# Patient Record
Sex: MALE | Race: WHITE | URBAN - METROPOLITAN AREA
[De-identification: names, ages, dates, MRNs, and addresses within clinical notes are randomized per-mention and may not be internally consistent; named-entity substitution may affect disease eponyms.]

---

## 2017-08-17 ENCOUNTER — PAT TELEPHONE (OUTPATIENT)
Dept: PREADMISSION TESTING | Age: 62
End: 2017-08-17

## 2017-08-17 VITALS — WEIGHT: 275 LBS | BODY MASS INDEX: 38.5 KG/M2 | HEIGHT: 71 IN

## 2017-08-17 RX ORDER — AMLODIPINE BESYLATE AND BENAZEPRIL HYDROCHLORIDE 10; 20 MG/1; MG/1
1 CAPSULE ORAL DAILY
COMMUNITY

## 2017-08-17 RX ORDER — ASPIRIN 81 MG/1
81 TABLET, CHEWABLE ORAL DAILY
Status: ON HOLD | COMMUNITY
End: 2017-08-24 | Stop reason: HOSPADM

## 2017-08-17 RX ORDER — M-VIT,TX,IRON,MINS/CALC/FOLIC 27MG-0.4MG
1 TABLET ORAL DAILY
COMMUNITY

## 2017-08-17 RX ORDER — NEBIVOLOL 10 MG/1
10 TABLET ORAL DAILY
COMMUNITY
End: 2021-11-12

## 2017-08-17 RX ORDER — ASCORBIC ACID 500 MG
500 TABLET ORAL DAILY
COMMUNITY

## 2017-08-17 RX ORDER — SILDENAFIL 100 MG/1
100 TABLET, FILM COATED ORAL PRN
COMMUNITY
End: 2020-11-10 | Stop reason: ALTCHOICE

## 2017-09-25 ENCOUNTER — OFFICE VISIT (OUTPATIENT)
Dept: CARDIOLOGY CLINIC | Age: 62
End: 2017-09-25

## 2017-09-25 VITALS
WEIGHT: 273 LBS | BODY MASS INDEX: 38.22 KG/M2 | HEART RATE: 66 BPM | OXYGEN SATURATION: 97 % | DIASTOLIC BLOOD PRESSURE: 74 MMHG | HEIGHT: 71 IN | SYSTOLIC BLOOD PRESSURE: 114 MMHG

## 2017-09-25 DIAGNOSIS — I10 ESSENTIAL HYPERTENSION: ICD-10-CM

## 2017-09-25 DIAGNOSIS — I25.10 CORONARY ARTERY DISEASE INVOLVING NATIVE CORONARY ARTERY OF NATIVE HEART WITHOUT ANGINA PECTORIS: ICD-10-CM

## 2017-09-25 DIAGNOSIS — I48.20 CHRONIC ATRIAL FIBRILLATION (HCC): Primary | ICD-10-CM

## 2017-09-25 PROCEDURE — 93000 ELECTROCARDIOGRAM COMPLETE: CPT | Performed by: INTERNAL MEDICINE

## 2017-09-25 PROCEDURE — 99214 OFFICE O/P EST MOD 30 MIN: CPT | Performed by: INTERNAL MEDICINE

## 2017-09-25 RX ORDER — ATORVASTATIN CALCIUM 10 MG/1
10 TABLET, FILM COATED ORAL NIGHTLY
Qty: 30 TABLET | Refills: 2 | Status: SHIPPED | OUTPATIENT
Start: 2017-09-25 | End: 2017-11-08 | Stop reason: SDUPTHER

## 2017-11-08 RX ORDER — ATORVASTATIN CALCIUM 10 MG/1
10 TABLET, FILM COATED ORAL NIGHTLY
Qty: 30 TABLET | Refills: 2 | Status: SHIPPED | OUTPATIENT
Start: 2017-11-08 | End: 2018-01-22 | Stop reason: SDUPTHER

## 2017-11-08 NOTE — TELEPHONE ENCOUNTER
Medication Refill    When was your last appointment with cardiology? 9/25/17  (if 1year or longer, please schedule an appointment)    Medication needing refilled: Eliquis and Lipitor    Doseage of the medication: 5 mg and 10 mg    Patient want a 30 or 90 day supply called in: 30 day supply    Which Pharmacy are we sending the medication to: [id number], [address] [place] [address].  Norman Evangelista [phone] - F [phone]

## 2017-12-28 ENCOUNTER — TELEPHONE (OUTPATIENT)
Dept: CARDIOLOGY CLINIC | Age: 62
End: 2017-12-28

## 2017-12-28 DIAGNOSIS — I48.91 ATRIAL FIBRILLATION, UNSPECIFIED TYPE (HCC): Primary | ICD-10-CM

## 2018-01-22 ENCOUNTER — OFFICE VISIT (OUTPATIENT)
Dept: CARDIOLOGY CLINIC | Age: 63
End: 2018-01-22

## 2018-01-22 VITALS
HEART RATE: 84 BPM | DIASTOLIC BLOOD PRESSURE: 90 MMHG | HEIGHT: 71 IN | OXYGEN SATURATION: 97 % | WEIGHT: 297 LBS | BODY MASS INDEX: 41.58 KG/M2 | SYSTOLIC BLOOD PRESSURE: 138 MMHG

## 2018-01-22 DIAGNOSIS — I10 ESSENTIAL HYPERTENSION: ICD-10-CM

## 2018-01-22 DIAGNOSIS — I48.20 CHRONIC A-FIB (HCC): Primary | ICD-10-CM

## 2018-01-22 DIAGNOSIS — Z01.810 PREOP CARDIOVASCULAR EXAM: ICD-10-CM

## 2018-01-22 DIAGNOSIS — I25.10 CORONARY ARTERY DISEASE INVOLVING NATIVE CORONARY ARTERY OF NATIVE HEART WITHOUT ANGINA PECTORIS: ICD-10-CM

## 2018-01-22 PROCEDURE — 93000 ELECTROCARDIOGRAM COMPLETE: CPT | Performed by: INTERNAL MEDICINE

## 2018-01-22 PROCEDURE — 99204 OFFICE O/P NEW MOD 45 MIN: CPT | Performed by: INTERNAL MEDICINE

## 2018-01-22 RX ORDER — ATORVASTATIN CALCIUM 10 MG/1
10 TABLET, FILM COATED ORAL NIGHTLY
Qty: 90 TABLET | Refills: 3 | Status: SHIPPED | OUTPATIENT
Start: 2018-01-22 | End: 2018-01-22 | Stop reason: SDUPTHER

## 2018-01-22 RX ORDER — ATORVASTATIN CALCIUM 10 MG/1
10 TABLET, FILM COATED ORAL NIGHTLY
Qty: 90 TABLET | Refills: 3 | Status: SHIPPED | OUTPATIENT
Start: 2018-01-22 | End: 2018-04-23 | Stop reason: SDUPTHER

## 2018-01-22 NOTE — PROGRESS NOTES
10-20 MG per capsule Take 1 capsule by mouth daily   Yes Historical Provider, MD   nebivolol (BYSTOLIC) 10 MG tablet Take 10 mg by mouth daily   Yes Historical Provider, MD   sildenafil (VIAGRA) 100 MG tablet Take 100 mg by mouth as needed for Erectile Dysfunction   Yes Historical Provider, MD   Multiple Vitamins-Minerals (THERAPEUTIC MULTIVITAMIN-MINERALS) tablet Take 1 tablet by mouth daily   Yes Historical Provider, MD   Ascorbic Acid (VITAMIN C) 500 MG tablet Take 500 mg by mouth daily   Yes Historical Provider, MD       Social History:   reports that he quit smoking about 7 months ago. He does not have any smokeless tobacco history on file. He reports that he drinks alcohol. He reports that he does not use drugs. Family History:  family history includes Diabetes in his father; Heart Attack in his father; Heart Disease in his mother. Reviewed. Denies family history of sudden cardiac death, arrhythmia, premature CAD    Review of System:    · General ROS: negative for - chills, fever   · Psychological ROS: negative for - anxiety or depression  · Ophthalmic ROS: negative for - eye pain or loss of vision  · ENT ROS: negative for - epistaxis, headaches, nasal discharge, sore throat   · Allergy and Immunology ROS: negative for - hives, nasal congestion   · Hematological and Lymphatic ROS: negative for - bleeding problems, blood clots, bruising or jaundice  · Endocrine ROS: negative for - skin changes, temperature intolerance or unexpected weight changes  · Respiratory ROS: negative for - cough, hemoptysis, pleuritic pain, SOB, sputum changes or wheezing  · Cardiovascular ROS: Per HPI.    · Gastrointestinal ROS: negative for - abdominal pain, blood in stools, diarrhea, hematemesis, melena,  nausea/vomiting or swallowing difficulty/pain  · Genito-Urinary ROS: negative for - dysuria or incontinence  · Musculoskeletal ROS: negative for - joint swelling or muscle pain  · Neurological ROS: negative for - confusion,

## 2018-01-22 NOTE — PATIENT INSTRUCTIONS
· You have symptoms of a stroke. These may include:  ¨ Sudden numbness, tingling, weakness, or loss of movement in your face, arm, or leg, especially on only one side of your body. ¨ Sudden vision changes. ¨ Sudden trouble speaking. ¨ Sudden confusion or trouble understanding simple statements. ¨ Sudden problems with walking or balance. ¨ A sudden, severe headache that is different from past headaches. ? · You passed out (lost consciousness). ?Call your doctor now or seek immediate medical care if:  ? · You have new or increased shortness of breath. ? · You feel dizzy or lightheaded, or you feel like you may faint. ? · Your heart rate becomes irregular. ? · You can feel your heart flutter in your chest or skip heartbeats. Tell your doctor if these symptoms are new or worse. ? Watch closely for changes in your health, and be sure to contact your doctor if you have any problems. Where can you learn more? Go to https://Vertigo.Woqu.com. org and sign in to your Biom'Up account. Enter U020 in the Lumics box to learn more about \"Atrial Fibrillation: Care Instructions. \"     If you do not have an account, please click on the \"Sign Up Now\" link. Current as of: September 21, 2016  Content Version: 11.5  © 6034-5638 Healthwise, Incorporated. Care instructions adapted under license by [facility] ([facility]). If you have questions about a medical condition or this instruction, always ask your healthcare professional. [place] [address] any warranty or liability for your use of this information.

## 2018-01-23 ENCOUNTER — TELEPHONE (OUTPATIENT)
Dept: CARDIOLOGY CLINIC | Age: 63
End: 2018-01-23

## 2018-04-23 ENCOUNTER — OFFICE VISIT (OUTPATIENT)
Dept: CARDIOLOGY CLINIC | Age: 63
End: 2018-04-23

## 2018-04-23 VITALS
HEART RATE: 70 BPM | HEIGHT: 71 IN | SYSTOLIC BLOOD PRESSURE: 118 MMHG | WEIGHT: 300 LBS | BODY MASS INDEX: 42 KG/M2 | DIASTOLIC BLOOD PRESSURE: 76 MMHG | OXYGEN SATURATION: 97 %

## 2018-04-23 DIAGNOSIS — I10 ESSENTIAL HYPERTENSION: ICD-10-CM

## 2018-04-23 DIAGNOSIS — I25.10 CORONARY ARTERY DISEASE INVOLVING NATIVE CORONARY ARTERY OF NATIVE HEART WITHOUT ANGINA PECTORIS: ICD-10-CM

## 2018-04-23 DIAGNOSIS — E78.2 MIXED HYPERLIPIDEMIA: ICD-10-CM

## 2018-04-23 DIAGNOSIS — I48.20 CHRONIC A-FIB (HCC): Primary | ICD-10-CM

## 2018-04-23 PROCEDURE — 93000 ELECTROCARDIOGRAM COMPLETE: CPT | Performed by: INTERNAL MEDICINE

## 2018-04-23 PROCEDURE — 99214 OFFICE O/P EST MOD 30 MIN: CPT | Performed by: INTERNAL MEDICINE

## 2018-04-23 RX ORDER — ATORVASTATIN CALCIUM 10 MG/1
10 TABLET, FILM COATED ORAL NIGHTLY
Qty: 90 TABLET | Refills: 3 | Status: SHIPPED | OUTPATIENT
Start: 2018-04-23 | End: 2019-04-24 | Stop reason: SDUPTHER

## 2018-07-18 ENCOUNTER — TELEPHONE (OUTPATIENT)
Dept: CARDIOLOGY CLINIC | Age: 63
End: 2018-07-18

## 2018-10-16 ENCOUNTER — OFFICE VISIT (OUTPATIENT)
Dept: CARDIOLOGY CLINIC | Age: 63
End: 2018-10-16
Payer: COMMERCIAL

## 2018-10-16 VITALS
SYSTOLIC BLOOD PRESSURE: 126 MMHG | WEIGHT: 306 LBS | HEIGHT: 71 IN | HEART RATE: 80 BPM | DIASTOLIC BLOOD PRESSURE: 86 MMHG | OXYGEN SATURATION: 98 % | BODY MASS INDEX: 42.84 KG/M2

## 2018-10-16 DIAGNOSIS — I25.10 CORONARY ARTERY DISEASE INVOLVING NATIVE CORONARY ARTERY OF NATIVE HEART WITHOUT ANGINA PECTORIS: ICD-10-CM

## 2018-10-16 DIAGNOSIS — I48.20 CHRONIC A-FIB (HCC): Primary | ICD-10-CM

## 2018-10-16 DIAGNOSIS — I10 ESSENTIAL HYPERTENSION: ICD-10-CM

## 2018-10-16 PROCEDURE — 99214 OFFICE O/P EST MOD 30 MIN: CPT | Performed by: INTERNAL MEDICINE

## 2018-10-16 PROCEDURE — 93000 ELECTROCARDIOGRAM COMPLETE: CPT | Performed by: INTERNAL MEDICINE

## 2018-10-16 RX ORDER — SODIUM BICARBONATE 325 MG/1
325 TABLET ORAL 2 TIMES DAILY
COMMUNITY

## 2018-11-26 ENCOUNTER — TELEPHONE (OUTPATIENT)
Dept: CARDIOLOGY CLINIC | Age: 63
End: 2018-11-26

## 2019-01-16 ENCOUNTER — TELEPHONE (OUTPATIENT)
Dept: CARDIOLOGY CLINIC | Age: 64
End: 2019-01-16

## 2019-04-24 RX ORDER — ATORVASTATIN CALCIUM 10 MG/1
10 TABLET, FILM COATED ORAL NIGHTLY
Qty: 90 TABLET | Refills: 3 | Status: SHIPPED | OUTPATIENT
Start: 2019-04-24 | End: 2020-11-10 | Stop reason: SDUPTHER

## 2019-04-24 NOTE — TELEPHONE ENCOUNTER
Please refill in Dr. Benavides Shoulder absence, thank you.   Last OV: 10/16/18, follow up in 1 yr  Next OV: nothing on file

## 2019-04-24 NOTE — TELEPHONE ENCOUNTER
Medication Question/Concern    What is the name of the medication you need to speak with someone about? Doseage of the medication:    How are you taking this medication:    What issues/concerns are you having with this medication:                Medication Refill    When was your last appointment with cardiology?  10/16/18  (if 1year or longer, please schedule an appointment)    Medication needing refilled: Eliquis     Doseage of the medication: 5 mg     How are you taking this medication (QD, BID, TID, QID, PRN):BID     Patient want a 30 or 90 day supply called in: 80    Which Pharmacy are we sending the medication to:[facility]     Pharmacy Phone [address] [phone] Fax

## 2019-05-17 RX ORDER — APIXABAN 5 MG/1
TABLET, FILM COATED ORAL
Qty: 60 TABLET | Refills: 0 | Status: SHIPPED | OUTPATIENT
Start: 2019-05-17 | End: 2019-05-22 | Stop reason: SDUPTHER

## 2019-05-22 RX ORDER — APIXABAN 5 MG/1
TABLET, FILM COATED ORAL
Qty: 60 TABLET | Refills: 5 | Status: SHIPPED | OUTPATIENT
Start: 2019-05-22 | End: 2019-11-07

## 2019-11-07 ENCOUNTER — OFFICE VISIT (OUTPATIENT)
Dept: CARDIOLOGY CLINIC | Age: 64
End: 2019-11-07
Payer: COMMERCIAL

## 2019-11-07 VITALS
SYSTOLIC BLOOD PRESSURE: 118 MMHG | DIASTOLIC BLOOD PRESSURE: 78 MMHG | OXYGEN SATURATION: 99 % | BODY MASS INDEX: 43.12 KG/M2 | WEIGHT: 308 LBS | HEIGHT: 71 IN | HEART RATE: 68 BPM

## 2019-11-07 DIAGNOSIS — I25.10 CORONARY ARTERY DISEASE INVOLVING NATIVE CORONARY ARTERY OF NATIVE HEART WITHOUT ANGINA PECTORIS: ICD-10-CM

## 2019-11-07 DIAGNOSIS — I10 ESSENTIAL HYPERTENSION: ICD-10-CM

## 2019-11-07 DIAGNOSIS — I48.21 PERMANENT ATRIAL FIBRILLATION (HCC): Primary | ICD-10-CM

## 2019-11-07 PROCEDURE — 93000 ELECTROCARDIOGRAM COMPLETE: CPT | Performed by: INTERNAL MEDICINE

## 2019-11-07 PROCEDURE — 99214 OFFICE O/P EST MOD 30 MIN: CPT | Performed by: INTERNAL MEDICINE

## 2019-11-12 ENCOUNTER — TELEPHONE (OUTPATIENT)
Dept: CARDIOLOGY CLINIC | Age: 64
End: 2019-11-12

## 2020-02-20 ENCOUNTER — TELEPHONE (OUTPATIENT)
Dept: CARDIOLOGY CLINIC | Age: 65
End: 2020-02-20

## 2020-02-20 NOTE — TELEPHONE ENCOUNTER
Spoke to pharmacy and let them know that I would submit prior auth and spoke to patient to let him know that he could  samples at .

## 2020-02-20 NOTE — TELEPHONE ENCOUNTER
[facility] pharmacy called, stating they faxed a request for a PA for Carley Heck' Eliquis prescription. She states they sent it over a while ago, but I informed her there is nothing in his chart regarding this med since Nov. She is refaxing the PA this morning. They can be reached at [phone] with any questions.

## 2020-11-05 NOTE — PROGRESS NOTES
[facility]      Cardiology Consult    Anil Tello  1955    November 10, 2020    Primary Physician: Dr. Osbaldo Fernandez  Reason for Visit: Atrial fibrillation    CC: \"A little swelling but no different. \"     HPI:  The patient is 63 y.o. male with a past medical history significant for CAD, HTN, and atrial fibrillation presents for chronic management of atrial fibrillation. He was originally seen during hospitalization 8/2017 with hematuria and was incidentally found to have atrial fibrillation. He was diagnosed with bladder cancer and has completed treatment. He is tolerating Eliquis and denies any hematuria. He previously followed with EP. Today, he states he is feeling great and denies any new cardiac sounding complaints. He denies any palpitations, chest pains, or worsening shortness of breath. He states he is walking weekly and denies any worsening exertional symptoms. He reports compliance with his medications and denies any abnormal bruising or bleeding. He states the Eliquis is affordable when using the coupon card. He reports chronic LE edema that is unchanged. Patient denies exertional chest pain/pressure, dyspnea at rest, worsening FOSTER, PND, orthopnea, palpitations, lightheadedness, weight changes, changes in LE edema, and syncope.      Past Medical History:   Diagnosis Date    Cancer [facility])     bladder tumor     Coronary artery calcification seen on CAT scan     Hematuria 08/06/2017    Hypertension     PAF (paroxysmal atrial fibrillation) (HCC)     Pre-diabetes     pre-diabetic    Wears glasses      Past Surgical History:   Procedure Laterality Date    BLADDER TUMOR EXCISION  08/23/2017    [facility] by Dr. Marcella Hill       Family History   Problem Relation Age of Onset    Heart Disease Mother         CHF    Diabetes Father     Heart Attack Father      Social History     Tobacco Use    Smoking status: Former Smoker     Last attempt to quit: 06/2017     Years since quitting: 3.4    Smokeless tobacco: Never Used   Substance Use Topics    Alcohol use: Yes     Comment: rare    Drug use: No     No Known Allergies  Current Outpatient Medications   Medication Sig Dispense Refill    apixaban (ELIQUIS) 5 MG TABS tablet TAKE 1 TABLET BY MOUTH TWICE A DAY 60 tablet 5    atorvastatin (LIPITOR) 10 MG tablet Take 1 tablet by mouth nightly 90 tablet 3    sodium bicarbonate 325 MG tablet Take 325 mg by mouth 2 times daily       metFORMIN (GLUCOPHAGE) 500 MG tablet Take 500 mg by mouth 3 times daily (with meals)       amLODIPine-benazepril (LOTREL) 10-20 MG per capsule Take 1 capsule by mouth daily      nebivolol (BYSTOLIC) 10 MG tablet Take 10 mg by mouth daily      Multiple Vitamins-Minerals (THERAPEUTIC MULTIVITAMIN-MINERALS) tablet Take 1 tablet by mouth daily      Ascorbic Acid (VITAMIN C) 500 MG tablet Take 500 mg by mouth daily       No current facility-administered medications for this visit. Review of Systems:  · Constitutional: no unanticipated weight loss. There's been no change in energy level, sleep pattern, or activity level. No fevers, chills. · Eyes: No visual changes or diplopia. No scleral icterus. · ENT: No Headaches, hearing loss or vertigo. No mouth sores or sore throat. · Cardiovascular: as reviewed in HPI  · Respiratory: No cough or wheezing, no sputum production. No hematemesis. · Gastrointestinal: No abdominal pain, appetite loss, blood in stools. No change in bowel or bladder habits. · Genitourinary: No dysuria, trouble voiding, or hematuria. · Musculoskeletal:  No gait disturbance, no joint complaints. · Integumentary: No rash or pruritis. · Neurological: No headache, diplopia, change in muscle strength, numbness or tingling. · Psychiatric: No anxiety or depression. · Endocrine: No temperature intolerance. No excessive thirst, fluid intake, or urination. No tremor.   · Hematologic/Lymphatic: No abnormal bruising or bleeding, blood clots or swollen lymph nodes. · Allergic/Immunologic: No nasal congestion or hives. Physical Exam:   /72 (Site: Left Upper Arm, Position: Sitting, Cuff Size: Medium Adult)   Pulse 78   Temp 97.3 °F (36.3 °C)   Ht 5' 11\" (1.803 m)   Wt (!) 306 lb 12.8 oz (139.2 kg) Comment: with shoes  SpO2 98%   BMI 42.79 kg/m²   Wt Readings from Last 3 Encounters:   11/10/20 (!) 306 lb 12.8 oz (139.2 kg)   11/07/19 (!) 308 lb (139.7 kg)   10/16/18 (!) 306 lb (138.8 kg)     Constitutional: He is oriented to person, place, and time. He appears well-developed and well-nourished. In no acute distress. Head: Normocephalic and atraumatic. Pupils equal and round. Neck: Neck supple. No JVP or carotid bruit appreciated. No mass and no thyromegaly present. No lymphadenopathy present. Cardiovascular: Irreg irreg with a normal rate. Normal heart sounds. Exam reveals no gallop and no friction rub. No murmur heard. Pulmonary/Chest: Effort normal and breath sounds normal. No respiratory distress. He has no wheezes, rhonchi or rales. Abdominal: Soft, non-tender. Bowel sounds are normal. He exhibits no organomegaly, mass or bruit. Extremities: Trace BLE ankle edema, cyanosis, or clubbing. Pulses are 2+ radial/carotid bilaterally. Neurological: No gross cranial nerve deficit. Coordination normal.   Skin: Skin is warm and dry. There is no rash or diaphoresis. Psychiatric: He has a normal mood and affect.  His speech is normal and behavior is normal.     Lab Review:   No results found for: TRIG, HDL, LDLCALC, LDLDIRECT, LABVLDL   Lipid 9/26/18 ([facility]) LDL 47, HDL 31, TRIG 138  9/2019 [facility] Trig 216, LDL 44, HDL 27  10/2020 Trig 216, LDL 39, HDL 31    Lab Results   Component Value Date    BUN 12 08/24/2017    CREATININE 0.8 08/24/2017   Renal ([facility]) 9/2018 Cr. 1.2, BUN 19, KCL 4.8  [facility] 9/2019 Hgb A1c 7.0, Cr 1.2, K4.8  [facility] 10/7/20 Cr 1.1, BUN 19, Hgb A1c 7.0    Cardiac Data      EKG: 8/23/17 Atrial fibrillation  9/25/17  Atrial flutter-fibrillation   10/16/18 Atrial fibrillation. Low voltage in precordial leads. RSR(V1) -nondiagnostic. 11/7/19  Atrial fibrillation. Low voltage in precordial leads. Echo: 8/24/17  Left ventricle size is normal. Normal left ventricular wall thickness. Ejection fraction is visually estimated to be 60-65 %. No regional wall motion abnormalities are noted. Diastolic function could not be fully evaluated due to arrhythmia. Patient appears to be in atrial fibrillation. Trivial mitral regurgitation is present. There is trivial tricuspid regurgitation with RVSP estimated at 26 mmHg. Estimated right atrial pressure is 5 mmHg.      CT abd: 8/5/17  Coronary artery calcifications are a marker of atherosclerosis.       Assessment and Plan   1) Permanent atrial fibrillation. Remains asymptomatic. CHADS-Vasc is 2 for HTN and CAD. Treatment options for atrial fibrillation discussed including rate control, anticoagulation, antiarrhythmics, cardioversion and possible ablation. Will continue with rate control strategy on B-blocker. Continue Eliquis 5mg BID. Instructed to call if Eliquis becomes cost prohibitive.      2) CAD. Asymptomatic. Based on coronary artery calcifications. Continue with medical management and risk factor modification. Continue Lipitor 10 mg nightly and LDL 39 (10/7/20).    3) Essential hypertension. Controlled. Goal BP <130/80. Continue medical therapy. Work on low sodium diet and instructed to call with any worsening LE edema. 4) Bladder cancer. Follows with urology and oncology. 5) Diabetes Type II. Hgb A1c 7.0. Management per PCP and on Metformin. 6) Morbid obesity. BMI 42.7. Encouraged weight loss and increase aerobic exercise as tolerated. Follow up in 1 year. Thank you very much for allowing me to participate in the care of your patient. Please do not hesitate to contact me if you have any questions.     Sincerely,  Natalie Adame Chandler Soriano MD      [address] [place] [address]  Ph: [phone]  Fax: [phone]    This note was scribed in the presence of Dr Atyia Delaney MD by Jennifer Pearce RN. Physician Attestation: The scribes documentation has been prepared under my direction and personally reviewed by me in its entirety. I confirm that the note above accurately reflects all work, treatment, procedures, and medical decision making performed by me. All portions of the note including but not limited to the chief complaint, history of present illness, physical exam, assessment and plan/medical decision making were personally reviewed, edited, and updated on the day of the visit.

## 2020-11-05 NOTE — PATIENT INSTRUCTIONS
Patient Education        DASH Diet: Care Instructions  Your Care Instructions     The DASH diet is an eating plan that can help lower your blood pressure. DASH stands for Dietary Approaches to Stop Hypertension. Hypertension is high blood pressure. The DASH diet focuses on eating foods that are high in calcium, potassium, and magnesium. These nutrients can lower blood pressure. The foods that are highest in these nutrients are fruits, vegetables, low-fat dairy products, nuts, seeds, and legumes. But taking calcium, potassium, and magnesium supplements instead of eating foods that are high in those nutrients does not have the same effect. The DASH diet also includes whole grains, fish, and poultry. The DASH diet is one of several lifestyle changes your doctor may recommend to lower your high blood pressure. Your doctor may also want you to decrease the amount of sodium in your diet. Lowering sodium while following the DASH diet can lower blood pressure even further than just the DASH diet alone. Follow-up care is a key part of your treatment and safety. Be sure to make and go to all appointments, and call your doctor if you are having problems. It's also a good idea to know your test results and keep a list of the medicines you take. How can you care for yourself at home? Following the DASH diet  · Eat 4 to 5 servings of fruit each day. A serving is 1 medium-sized piece of fruit, ½ cup chopped or canned fruit, 1/4 cup dried fruit, or 4 ounces (½ cup) of fruit juice. Choose fruit more often than fruit juice. · Eat 4 to 5 servings of vegetables each day. A serving is 1 cup of lettuce or raw leafy vegetables, ½ cup of chopped or cooked vegetables, or 4 ounces (½ cup) of vegetable juice. Choose vegetables more often than vegetable juice. · Get 2 to 3 servings of low-fat and fat-free dairy each day. A serving is 8 ounces of milk, 1 cup of yogurt, or 1 ½ ounces of cheese. · Eat 6 to 8 servings of grains each day. A serving is 1 slice of bread, 1 ounce of dry cereal, or ½ cup of cooked rice, pasta, or cooked cereal. Try to choose whole-grain products as much as possible. · Limit lean meat, poultry, and fish to 2 servings each day. A serving is 3 ounces, about the size of a deck of cards. · Eat 4 to 5 servings of nuts, seeds, and legumes (cooked dried beans, lentils, and split peas) each week. A serving is 1/3 cup of nuts, 2 tablespoons of seeds, or ½ cup of cooked beans or peas. · Limit fats and oils to 2 to 3 servings each day. A serving is 1 teaspoon of vegetable oil or 2 tablespoons of salad dressing. · Limit sweets and added sugars to 5 servings or less a week. A serving is 1 tablespoon jelly or jam, ½ cup sorbet, or 1 cup of lemonade. · Eat less than 2,300 milligrams (mg) of sodium a day. If you limit your sodium to 1,500 mg a day, you can lower your blood pressure even more. Tips for success  · Start small. Do not try to make dramatic changes to your diet all at once. You might feel that you are missing out on your favorite foods and then be more likely to not follow the plan. Make small changes, and stick with them. Once those changes become habit, add a few more changes. · Try some of the following:  ? Make it a goal to eat a fruit or vegetable at every meal and at snacks. This will make it easy to get the recommended amount of fruits and vegetables each day. ? Try yogurt topped with fruit and nuts for a snack or healthy dessert. ? Add lettuce, tomato, cucumber, and onion to sandwiches. ? Combine a ready-made pizza crust with low-fat mozzarella cheese and lots of vegetable toppings. Try using tomatoes, squash, spinach, broccoli, carrots, cauliflower, and onions. ? Have a variety of cut-up vegetables with a low-fat dip as an appetizer instead of chips and dip. ? Sprinkle sunflower seeds or chopped almonds over salads. Or try adding chopped walnuts or almonds to cooked vegetables.   ? Try some vegetarian meals using beans and peas. Add garbanzo or kidney beans to salads. Make burritos and tacos with mashed chávez beans or black beans. Where can you learn more? Go to https://chjaysoneb.NextVR. org and sign in to your tydy account. Enter X267 in the Teachernow box to learn more about \"DASH Diet: Care Instructions. \"     If you do not have an account, please click on the \"Sign Up Now\" link. Current as of: December 16, 2019               Content Version: 12.6  © 2062-1518 eTukTuk, Incorporated. Care instructions adapted under license by [facility] ([facility]). If you have questions about a medical condition or this instruction, always ask your healthcare professional. [address] any warranty or liability for your use of this information.

## 2020-11-10 ENCOUNTER — OFFICE VISIT (OUTPATIENT)
Dept: CARDIOLOGY CLINIC | Age: 65
End: 2020-11-10
Payer: COMMERCIAL

## 2020-11-10 VITALS
OXYGEN SATURATION: 98 % | HEART RATE: 78 BPM | DIASTOLIC BLOOD PRESSURE: 72 MMHG | SYSTOLIC BLOOD PRESSURE: 128 MMHG | WEIGHT: 306.8 LBS | BODY MASS INDEX: 42.95 KG/M2 | HEIGHT: 71 IN | TEMPERATURE: 97.3 F

## 2020-11-10 PROCEDURE — 93000 ELECTROCARDIOGRAM COMPLETE: CPT | Performed by: INTERNAL MEDICINE

## 2020-11-10 PROCEDURE — 99214 OFFICE O/P EST MOD 30 MIN: CPT | Performed by: INTERNAL MEDICINE

## 2020-11-10 RX ORDER — ATORVASTATIN CALCIUM 10 MG/1
10 TABLET, FILM COATED ORAL NIGHTLY
Qty: 90 TABLET | Refills: 3 | Status: SHIPPED | OUTPATIENT
Start: 2020-11-10 | End: 2021-11-30

## 2021-11-09 NOTE — PROGRESS NOTES
Never Used   Substance Use Topics    Alcohol use: Yes     Comment: rare    Drug use: No     No Known Allergies  Current Outpatient Medications   Medication Sig Dispense Refill    carvedilol (COREG) 3.125 MG tablet Take 3.125 mg by mouth 2 times daily (with meals) Will start medication 11/22/2021      apixaban (ELIQUIS) 5 MG TABS tablet TAKE 1 TABLET BY MOUTH TWICE A  tablet 3    atorvastatin (LIPITOR) 10 MG tablet Take 1 tablet by mouth nightly 90 tablet 3    sodium bicarbonate 325 MG tablet Take 325 mg by mouth 2 times daily       metFORMIN (GLUCOPHAGE) 500 MG tablet Take 500 mg by mouth 3 times daily (with meals)       amLODIPine-benazepril (LOTREL) 10-20 MG per capsule Take 1 capsule by mouth daily      Multiple Vitamins-Minerals (THERAPEUTIC MULTIVITAMIN-MINERALS) tablet Take 1 tablet by mouth daily      Ascorbic Acid (VITAMIN C) 500 MG tablet Take 500 mg by mouth daily       No current facility-administered medications for this visit. Review of Systems:  · Constitutional: no unanticipated weight loss. There's been no change in energy level, sleep pattern, or activity level. No fevers, chills. · Eyes: No visual changes or diplopia. No scleral icterus. · ENT: No Headaches, hearing loss or vertigo. No mouth sores or sore throat. · Cardiovascular: as reviewed in HPI  · Respiratory: No cough or wheezing, no sputum production. No hematemesis. · Gastrointestinal: No abdominal pain, appetite loss, blood in stools. No change in bowel or bladder habits. · Genitourinary: No dysuria, trouble voiding, or hematuria. · Musculoskeletal:  No gait disturbance, no joint complaints. · Integumentary: No rash or pruritis. · Neurological: No headache, diplopia, change in muscle strength, numbness or tingling. · Psychiatric: No anxiety or depression. · Endocrine: No temperature intolerance. No excessive thirst, fluid intake, or urination. No tremor.   · Hematologic/Lymphatic: No abnormal bruising or bleeding, blood clots or swollen lymph nodes. · Allergic/Immunologic: No nasal congestion or hives. Physical Exam:   /84   Pulse 77   Ht 5' 11\" (1.803 m)   Wt 277 lb (125.6 kg)   SpO2 97%   BMI 38.63 kg/m²   Wt Readings from Last 3 Encounters:   21 277 lb (125.6 kg)   11/10/20 (!) 306 lb 12.8 oz (139.2 kg)   19 (!) 308 lb (139.7 kg)     Constitutional: He is oriented to person, place, and time. He appears well-developed and well-nourished. In no acute distress. Head: Normocephalic and atraumatic. Pupils equal and round. Neck: Neck supple. No JVP or carotid bruit appreciated. No mass and no thyromegaly present. No lymphadenopathy present. Cardiovascular: Irreg irreg with a normal rate. Normal heart sounds. Exam reveals no gallop and no friction rub. No murmur heard. Pulmonary/Chest: Effort normal and breath sounds normal. No respiratory distress. He has no wheezes, rhonchi or rales. Abdominal: Soft, non-tender. Bowel sounds are normal. He exhibits no organomegaly, mass or bruit. Extremities: Trace BLE ankle edema, cyanosis, or clubbing. Pulses are 2+ radial/carotid bilaterally. Neurological: No gross cranial nerve deficit. Coordination normal.   Skin: Skin is warm and dry. There is no rash or diaphoresis. Psychiatric: He has a normal mood and affect. His speech is normal and behavior is normal.     Lab Review:   No results found for: TRIG, HDL, LDLCALC, LDLDIRECT, LABVLDL   2019 Trig 216, LDL 44, HDL 27  10/2020 Trig 216, LDL 39, HDL 31  21 Trig 287, LDL 37, HDL 26    Lab Results   Component Value Date    BUN 12 2017    CREATININE 0.8 2017   St E 2019 Hgb A1c 7.0, Cr 1.2, K4.8  ST. E 10/7/20 Cr 1.1, BUN 19, Hgb A1c 7.0    Cardiac Data      EK17 Atrial fibrillation  17  Atrial flutter-fibrillation   10/16/18 Atrial fibrillation. Low voltage in precordial leads. RSR(V1) -nondiagnostic. 19  Atrial fibrillation.  Low voltage in precordial leads.   11/12/21 Atrial fibrillation. RSR(V1) nondiagnostic. Echo: 8/24/17  Left ventricle size is normal. Normal left ventricular wall thickness. Ejection fraction is visually estimated to be 60-65 %. No regional wall motion abnormalities are noted. Diastolic function could not be fully evaluated due to arrhythmia. Patient appears to be in atrial fibrillation. Trivial mitral regurgitation is present. There is trivial tricuspid regurgitation with RVSP estimated at 26 mmHg. Estimated right atrial pressure is 5 mmHg.      CT abd: 8/5/17  Coronary artery calcifications are a marker of atherosclerosis.       Assessment and Plan   1) Permanent atrial fibrillation. Remains asymptomatic. CHADS-Vasc is 2 for HTN and CAD. Treatment options for atrial fibrillation discussed including rate control, anticoagulation, antiarrhythmics, cardioversion and possible ablation. Will continue with rate control strategy. Continue Eliquis 5mg BID. Instructed to call if Eliquis becomes cost prohibitive.      2) CAD. Asymptomatic. Based on coronary artery calcifications. Continue with medical management and risk factor modification. Continue Lipitor 10 mg nightly and LDL 37 (6/2021).    3) Essential hypertension. Controlled. Goal BP <130/80. Continue medical therapy. Work on low sodium diet and instructed to call with any worsening LE edema. CCB could be switched from Norvasc to diltiazem and d/c B-blocker if patient wished to reduced medications. 4) Bladder cancer. Follows with urology and oncology. 5) Diabetes Type II. Hgb A1c 6.0. Management per PCP and on Metformin. 6) Obesity. BMI 38 Encouraged weight loss and increase aerobic exercise as tolerated. Follow up in 1 year    Thank you very much for allowing me to participate in the care of your patient. Please do not hesitate to contact me if you have any questions. Sincerely,  Shakir Smith.  Michel Suazo, [address] [facility], [facility] [address]  Ph: [phone]  Fax: [phone]    This note was scribed in the presence of Dr Michel Suazo MD by Theo Lee RN. Physician Attestation: The scribes documentation has been prepared under my direction and personally reviewed by me in its entirety. I confirm that the note above accurately reflects all work, treatment, procedures, and medical decision making performed by me. All portions of the note including but not limited to the chief complaint, history of present illness, physical exam, assessment and plan/medical decision making were personally reviewed, edited, and updated on the day of the visit.

## 2021-11-09 NOTE — PATIENT INSTRUCTIONS
Patient Education        DASH Diet: Care Instructions  Your Care Instructions     The DASH diet is an eating plan that can help lower your blood pressure. DASH stands for Dietary Approaches to Stop Hypertension. Hypertension is high blood pressure. The DASH diet focuses on eating foods that are high in calcium, potassium, and magnesium. These nutrients can lower blood pressure. The foods that are highest in these nutrients are fruits, vegetables, low-fat dairy products, nuts, seeds, and legumes. But taking calcium, potassium, and magnesium supplements instead of eating foods that are high in those nutrients does not have the same effect. The DASH diet also includes whole grains, fish, and poultry. The DASH diet is one of several lifestyle changes your doctor may recommend to lower your high blood pressure. Your doctor may also want you to decrease the amount of sodium in your diet. Lowering sodium while following the DASH diet can lower blood pressure even further than just the DASH diet alone. Follow-up care is a key part of your treatment and safety. Be sure to make and go to all appointments, and call your doctor if you are having problems. It's also a good idea to know your test results and keep a list of the medicines you take. How can you care for yourself at home? Following the DASH diet  · Eat 4 to 5 servings of fruit each day. A serving is 1 medium-sized piece of fruit, ½ cup chopped or canned fruit, 1/4 cup dried fruit, or 4 ounces (½ cup) of fruit juice. Choose fruit more often than fruit juice. · Eat 4 to 5 servings of vegetables each day. A serving is 1 cup of lettuce or raw leafy vegetables, ½ cup of chopped or cooked vegetables, or 4 ounces (½ cup) of vegetable juice. Choose vegetables more often than vegetable juice. · Get 2 to 3 servings of low-fat and fat-free dairy each day. A serving is 8 ounces of milk, 1 cup of yogurt, or 1 ½ ounces of cheese. · Eat 6 to 8 servings of grains each day. A serving is 1 slice of bread, 1 ounce of dry cereal, or ½ cup of cooked rice, pasta, or cooked cereal. Try to choose whole-grain products as much as possible. · Limit lean meat, poultry, and fish to 2 servings each day. A serving is 3 ounces, about the size of a deck of cards. · Eat 4 to 5 servings of nuts, seeds, and legumes (cooked dried beans, lentils, and split peas) each week. A serving is 1/3 cup of nuts, 2 tablespoons of seeds, or ½ cup of cooked beans or peas. · Limit fats and oils to 2 to 3 servings each day. A serving is 1 teaspoon of vegetable oil or 2 tablespoons of salad dressing. · Limit sweets and added sugars to 5 servings or less a week. A serving is 1 tablespoon jelly or jam, ½ cup sorbet, or 1 cup of lemonade. · Eat less than 2,300 milligrams (mg) of sodium a day. If you limit your sodium to 1,500 mg a day, you can lower your blood pressure even more. · Be aware that all of these are the suggested number of servings for people who eat 1,800 to 2,000 calories a day. Your recommended number of servings may be different if you need more or fewer calories. Tips for success  · Start small. Do not try to make dramatic changes to your diet all at once. You might feel that you are missing out on your favorite foods and then be more likely to not follow the plan. Make small changes, and stick with them. Once those changes become habit, add a few more changes. · Try some of the following:  ? Make it a goal to eat a fruit or vegetable at every meal and at snacks. This will make it easy to get the recommended amount of fruits and vegetables each day. ? Try yogurt topped with fruit and nuts for a snack or healthy dessert. ? Add lettuce, tomato, cucumber, and onion to sandwiches. ? Combine a ready-made pizza crust with low-fat mozzarella cheese and lots of vegetable toppings. Try using tomatoes, squash, spinach, broccoli, carrots, cauliflower, and onions. ?  Have a variety of cut-up vegetables with a low-fat dip as an appetizer instead of chips and dip. ? Sprinkle sunflower seeds or chopped almonds over salads. Or try adding chopped walnuts or almonds to cooked vegetables. ? Try some vegetarian meals using beans and peas. Add garbanzo or kidney beans to salads. Make burritos and tacos with mashed chávez beans or black beans. Where can you learn more? Go to https://[employer]. org and sign in to your [facility] account. Enter E610 in the [employer] box to learn more about \"DASH Diet: Care Instructions. \"     If you do not have an account, please click on the \"Sign Up Now\" link. Current as of: April 29, 2021               Content Version: 13.0  © 4460-1164 Healthwise, Incorporated. Care instructions adapted under license by [facility] [place] [facility] [facility] If you have questions about a medical condition or this instruction, always ask your healthcare professional. [address] any warranty or liability for your use of this information.

## 2021-11-12 ENCOUNTER — OFFICE VISIT (OUTPATIENT)
Dept: CARDIOLOGY CLINIC | Age: 66
End: 2021-11-12
Payer: COMMERCIAL

## 2021-11-12 VITALS
HEART RATE: 77 BPM | OXYGEN SATURATION: 97 % | DIASTOLIC BLOOD PRESSURE: 84 MMHG | BODY MASS INDEX: 38.78 KG/M2 | WEIGHT: 277 LBS | SYSTOLIC BLOOD PRESSURE: 124 MMHG | HEIGHT: 71 IN

## 2021-11-12 DIAGNOSIS — I25.10 CORONARY ARTERY DISEASE INVOLVING NATIVE CORONARY ARTERY OF NATIVE HEART WITHOUT ANGINA PECTORIS: ICD-10-CM

## 2021-11-12 DIAGNOSIS — E66.01 MORBID OBESITY WITH BMI OF 40.0-44.9, ADULT (HCC): ICD-10-CM

## 2021-11-12 DIAGNOSIS — I10 ESSENTIAL HYPERTENSION: ICD-10-CM

## 2021-11-12 DIAGNOSIS — E11.9 TYPE 2 DIABETES MELLITUS WITHOUT COMPLICATION, WITHOUT LONG-TERM CURRENT USE OF INSULIN (HCC): ICD-10-CM

## 2021-11-12 DIAGNOSIS — I48.21 PERMANENT ATRIAL FIBRILLATION (HCC): Primary | ICD-10-CM

## 2021-11-12 PROCEDURE — 93000 ELECTROCARDIOGRAM COMPLETE: CPT | Performed by: INTERNAL MEDICINE

## 2021-11-12 PROCEDURE — 99214 OFFICE O/P EST MOD 30 MIN: CPT | Performed by: INTERNAL MEDICINE

## 2021-11-12 RX ORDER — CARVEDILOL 3.12 MG/1
3.12 TABLET ORAL 2 TIMES DAILY WITH MEALS
COMMUNITY

## 2021-11-22 ENCOUNTER — TELEPHONE (OUTPATIENT)
Dept: CARDIOLOGY CLINIC | Age: 66
End: 2021-11-22

## 2021-11-22 NOTE — TELEPHONE ENCOUNTER
Patient is cleared for colonoscopy from cardiac standpoint. Okay to hold Eliquis for 2 days prior to colonoscopy.   Restart Eliquis immediately post colonoscopy when okay from GI standpoint

## 2021-11-22 NOTE — TELEPHONE ENCOUNTER
Dr. Robert Rivera    Please review and advise in Dr. Eliazar Hunter absence. Patient last seen in the office 11/12/21. Hx of afib, HTN.

## 2021-11-22 NOTE — TELEPHONE ENCOUNTER
[facility] GI requesting cardiac clearance for patient to have a colonoscopy with possible biopsy and/or polypectomy with MAC sedation. Pt will need to hold Eiliquis 2 days prior to procedure. No procedure date stated on form. Pt has had no recent cardiac procedures within the last year.     Phone: [phone]  Fax: [phone]

## 2021-11-22 NOTE — LETTER
Jae Pichardo  Phone: [phone]  Fax: [phone]    Stephanie Cobos MD        November 23, 2021    Ofelia Sanderson  1955    To Whom This May Concern:      Patient is cleared for colonoscopy from cardiac standpoint. Okay to hold Eliquis for 2 days prior to colonoscopy. Restart Eliquis immediately post colonoscopy when okay from GI standpoint  If you have any questions or concerns, please don't hesitate to call.     Sincerely,      Sid Buchanan MD/ Batool Landaverde MA

## 2021-11-30 RX ORDER — ATORVASTATIN CALCIUM 10 MG/1
TABLET, FILM COATED ORAL
Qty: 90 TABLET | Refills: 3 | Status: SHIPPED | OUTPATIENT
Start: 2021-11-30

## 2021-11-30 NOTE — TELEPHONE ENCOUNTER
Last ov 11/12/21  Pending appt yearly  Last refill 11/10/20 #[id number]  Last labs 6/2/21 lipid in care everywhere

## 2022-01-25 ENCOUNTER — TELEPHONE (OUTPATIENT)
Dept: CARDIOLOGY CLINIC | Age: 67
End: 2022-01-25

## 2022-01-25 NOTE — TELEPHONE ENCOUNTER
Sabrina Munroe got a letter from his [employer] stating that the Dr. Rere Nino needs to contact them reguarding his Eliquis coverage.    The number left for insurance company is   [phone]    Sabrina Munroe can be reached back at [phone]

## 2022-05-25 NOTE — TELEPHONE ENCOUNTER
Last OV: 11/12/21  Next OV: 1 yr f/u 11/2022  Last refill:5/24/21  Most recent Labs: 8/24/17  Last EKG (if needed):11/12/21

## 2022-11-15 ENCOUNTER — OFFICE VISIT (OUTPATIENT)
Dept: CARDIOLOGY CLINIC | Age: 67
End: 2022-11-15
Payer: MEDICARE

## 2022-11-15 VITALS
WEIGHT: 286.6 LBS | OXYGEN SATURATION: 98 % | DIASTOLIC BLOOD PRESSURE: 98 MMHG | BODY MASS INDEX: 39.97 KG/M2 | SYSTOLIC BLOOD PRESSURE: 138 MMHG | HEART RATE: 92 BPM

## 2022-11-15 DIAGNOSIS — Z85.51 HISTORY OF BLADDER CANCER: ICD-10-CM

## 2022-11-15 DIAGNOSIS — E66.01 MORBID OBESITY WITH BMI OF 40.0-44.9, ADULT (HCC): ICD-10-CM

## 2022-11-15 DIAGNOSIS — I48.21 PERMANENT ATRIAL FIBRILLATION (HCC): Primary | ICD-10-CM

## 2022-11-15 DIAGNOSIS — I25.10 CORONARY ARTERY DISEASE INVOLVING NATIVE CORONARY ARTERY OF NATIVE HEART WITHOUT ANGINA PECTORIS: ICD-10-CM

## 2022-11-15 DIAGNOSIS — I10 ESSENTIAL HYPERTENSION: ICD-10-CM

## 2022-11-15 PROCEDURE — G8484 FLU IMMUNIZE NO ADMIN: HCPCS | Performed by: INTERNAL MEDICINE

## 2022-11-15 PROCEDURE — 3074F SYST BP LT 130 MM HG: CPT | Performed by: INTERNAL MEDICINE

## 2022-11-15 PROCEDURE — G8428 CUR MEDS NOT DOCUMENT: HCPCS | Performed by: INTERNAL MEDICINE

## 2022-11-15 PROCEDURE — 1123F ACP DISCUSS/DSCN MKR DOCD: CPT | Performed by: INTERNAL MEDICINE

## 2022-11-15 PROCEDURE — 99214 OFFICE O/P EST MOD 30 MIN: CPT | Performed by: INTERNAL MEDICINE

## 2022-11-15 PROCEDURE — 1036F TOBACCO NON-USER: CPT | Performed by: INTERNAL MEDICINE

## 2022-11-15 PROCEDURE — 3017F COLORECTAL CA SCREEN DOC REV: CPT | Performed by: INTERNAL MEDICINE

## 2022-11-15 PROCEDURE — 3078F DIAST BP <80 MM HG: CPT | Performed by: INTERNAL MEDICINE

## 2022-11-15 PROCEDURE — G8417 CALC BMI ABV UP PARAM F/U: HCPCS | Performed by: INTERNAL MEDICINE

## 2022-11-15 NOTE — PROGRESS NOTES
[facility]      Cardiology Consult    Ilan Tello  1955    November 15, 2022    Primary Physician: Mercedez Mora MD  Reason for Visit: Atrial fibrillation    CC: \"I have no concerns\"    HPI:  The patient is 74 y.o. male with a past medical history significant for CAD, HTN, and atrial fibrillation presents for chronic management of atrial fibrillation. He was originally seen during hospitalization 8/2017 with hematuria and was incidentally found to have atrial fibrillation. He was diagnosed with bladder cancer and has completed treatment. He is tolerating Eliquis and denies any hematuria. He previously followed with EP. Today, he states he is overall doing well. He denies any new cardiac sounding complaints. He denies any palpitations, chest pains, or worsening shortness of breath. He continues to remain active with walking without any exertional symptoms. He reports compliance with his medications and is tolerating. He denies any abnormal bruising or bleeding. He states the Eliquis is affordable when using the coupon card. He reports chronic LE edema that remains unchanged. Patient denies exertional chest pain/pressure, dyspnea at rest, FOSTER, PND, orthopnea, palpitations, lightheadedness, weight changes, and syncope.     Past Medical History:   Diagnosis Date    Cancer [facility])     bladder tumor     Coronary artery calcification seen on CAT scan     Hematuria 08/06/2017    Hypertension     PAF (paroxysmal atrial fibrillation) (HCC)     Pre-diabetes     pre-diabetic    Wears glasses      Past Surgical History:   Procedure Laterality Date    BLADDER TUMOR EXCISION  08/23/2017    [facility] by Dr. Damon Gonzalez       Family History   Problem Relation Age of Onset    Heart Disease Mother         CHF    Diabetes Father     Heart Attack Father      Social History     Tobacco Use    Smoking status: Former     Types: Cigarettes     Quit date: 06/2017     Years since quittin.4    Smokeless tobacco: Never   Substance Use Topics    Alcohol use: Yes     Comment: rare    Drug use: No     No Known Allergies  Current Outpatient Medications   Medication Sig Dispense Refill    apixaban (ELIQUIS) 5 MG TABS tablet TAKE 1 TABLET BY MOUTH TWICE A  tablet 3    atorvastatin (LIPITOR) 10 MG tablet TAKE 1 TABLET NIGHTLY 90 tablet 3    carvedilol (COREG) 3.125 MG tablet Take 3.125 mg by mouth 2 times daily (with meals) Will start medication 2021      sodium bicarbonate 325 MG tablet Take 325 mg by mouth 2 times daily       metFORMIN (GLUCOPHAGE) 500 MG tablet Take 500 mg by mouth 3 times daily (with meals)       amLODIPine-benazepril (LOTREL) 10-20 MG per capsule Take 1 capsule by mouth daily      Multiple Vitamins-Minerals (THERAPEUTIC MULTIVITAMIN-MINERALS) tablet Take 1 tablet by mouth daily      Ascorbic Acid (VITAMIN C) 500 MG tablet Take 500 mg by mouth daily       No current facility-administered medications for this visit. Review of Systems:  Constitutional: no unanticipated weight loss. There's been no change in energy level, sleep pattern, or activity level. No fevers, chills. Eyes: No visual changes or diplopia. No scleral icterus. ENT: No Headaches, hearing loss or vertigo. No mouth sores or sore throat. Cardiovascular: as reviewed in HPI  Respiratory: No cough or wheezing, no sputum production. No hematemesis. Gastrointestinal: No abdominal pain, appetite loss, blood in stools. No change in bowel or bladder habits. Genitourinary: No dysuria, trouble voiding, or hematuria. Musculoskeletal:  No gait disturbance, no joint complaints. Integumentary: No rash or pruritis. Neurological: No headache, diplopia, change in muscle strength, numbness or tingling. Psychiatric: No anxiety or depression. Endocrine: No temperature intolerance. No excessive thirst, fluid intake, or urination. No tremor.   Hematologic/Lymphatic: No abnormal bruising or bleeding, blood clots or swollen lymph nodes. Allergic/Immunologic: No nasal congestion or hives. Physical Exam:   BP (!) 138/98   Pulse 92   Wt 286 lb 9.6 oz (130 kg)   SpO2 98%   BMI 39.97 kg/m²   Wt Readings from Last 3 Encounters:   11/15/22 286 lb 9.6 oz (130 kg)   21 277 lb (125.6 kg)   11/10/20 (!) 306 lb 12.8 oz (139.2 kg)     Constitutional: He is oriented to person, place, and time. He appears well-developed and well-nourished. In no acute distress. Head: Normocephalic and atraumatic. Pupils equal and round. Neck: Neck supple. No JVP or carotid bruit appreciated. No mass and no thyromegaly present. No lymphadenopathy present. Cardiovascular: Irreg irreg with a normal rate. Normal heart sounds. Exam reveals no gallop and no friction rub. No murmur heard. Pulmonary/Chest: Effort normal and breath sounds normal. No respiratory distress. He has no wheezes, rhonchi or rales. Abdominal: Soft, non-tender. Bowel sounds are normal. He exhibits no organomegaly, mass or bruit. Extremities: Trace BLE ankle edema, cyanosis, or clubbing. Pulses are 2+ radial/carotid bilaterally. Neurological: No gross cranial nerve deficit. Coordination normal.   Skin: Skin is warm and dry. There is no rash or diaphoresis. Psychiatric: He has a normal mood and affect. His speech is normal and behavior is normal.     Lab Review:   No results found for: TRIG, HDL, LDLCALC, LDLDIRECT, LABVLDL   2019 Trig 216, LDL 44, HDL 27  10/2020 Trig 216, LDL 39, HDL 31  21 Trig 287, LDL 37, HDL 26    Lab Results   Component Value Date/Time    BUN 12 2017 05:46 AM    CREATININE 0.8 2017 05:46 AM   St E 2019 Hgb A1c 7.0, Cr 1.2, K4.8  ST. E 10/7/20 Cr 1.1, BUN 19, Hgb A1c 7.0    Cardiac Data      EK17 Atrial fibrillation  17  Atrial flutter-fibrillation   10/16/18 Atrial fibrillation. Low voltage in precordial leads. RSR(V1) -nondiagnostic. 19  Atrial fibrillation. Low voltage in precordial leads. 11/12/21 Atrial fibrillation. RSR(V1) nondiagnostic. Echo: 8/24/17  Left ventricle size is normal. Normal left ventricular wall thickness. Ejection fraction is visually estimated to be 60-65 %. No regional wall motion abnormalities are noted. Diastolic function could not be fully evaluated due to arrhythmia. Patient appears to be in atrial fibrillation. Trivial mitral regurgitation is present. There is trivial tricuspid regurgitation with RVSP estimated at 26 mmHg. Estimated right atrial pressure is 5 mmHg. CT abd: 8/5/17  Coronary artery calcifications are a marker of atherosclerosis. Assessment and Plan   1) Permanent atrial fibrillation. Remains asymptomatic. CHADS-Vasc is 3 for age, HTN, and CAD. Treatment options for atrial fibrillation discussed including rate control, anticoagulation, antiarrhythmics, cardioversion and possible ablation. Will continue with rate control strategy. Continue Eliquis 5mg BID. Instructed to call if Eliquis becomes cost prohibitive. 2) CAD. Asymptomatic. Based on coronary artery calcifications. Continue with medical management and risk factor modification. Continue Lipitor 10 mg nightly and LDL 37 (6/2021). 3) Essential hypertension. Typically controlled, but slightly elevated today. Goal BP <130/80. Continue Coreg 3.125 mg BID and Lotrel 10-20 mg daily. Work on low sodium diet and instructed to call with any worsening LE edema. 4) Bladder cancer. Follows with urology and oncology. 5) Diabetes Type II. Hgb A1c 6.0. Management per PCP and on Metformin. 6) Obesity. BMI 39.97. Encouraged weight loss and increase aerobic exercise as tolerated. Follow up in 1 year    Thank you very much for allowing me to participate in the care of your patient. Please do not hesitate to contact me if you have any questions. Sincerely,  Amina Ren.  Akanksha Oven, [address] [facility], [address] [place] [address]  Ph: [phone]  Fax: [phone]    This note was scribed in the presence of Dr Rui Quiñones MD by Jose Carrillo RN. Physician Attestation: The scribes documentation has been prepared under my direction and personally reviewed by me in its entirety. I confirm that the note above accurately reflects all work, treatment, procedures, and medical decision making performed by me. All portions of the note including but not limited to the chief complaint, history of present illness, physical exam, assessment and plan/medical decision making were personally reviewed, edited, and updated on the day of the visit.

## 2022-12-14 RX ORDER — ATORVASTATIN CALCIUM 10 MG/1
TABLET, FILM COATED ORAL
Qty: 90 TABLET | Refills: 3 | Status: SHIPPED | OUTPATIENT
Start: 2022-12-14

## 2023-02-20 ENCOUNTER — TELEPHONE (OUTPATIENT)
Dept: CARDIOLOGY CLINIC | Age: 68
End: 2023-02-20

## 2023-02-20 NOTE — TELEPHONE ENCOUNTER
Chantel Ralph called in stating that he attempted to get his Eliquis refill from [facility]. He was told that they sent a fax to Dr. Harris and are awaiting a return fax.      John's callback: [phone]

## 2023-02-21 NOTE — TELEPHONE ENCOUNTER
Amanda Goins called in this morning wanting to talk to Mk Telle[employer], [employer]. He states that when he went to  his Eliquis, the pharmacist said his insurance isn't going to cover it without a prior authorization. Amanda Goins said that the pharmacy was going to fax a from over to us.      You can reach Amanda Goins at #[phone]

## 2023-02-22 ENCOUNTER — TELEPHONE (OUTPATIENT)
Dept: CARDIOLOGY CLINIC | Age: 68
End: 2023-02-22

## 2023-02-22 NOTE — TELEPHONE ENCOUNTER
Jayde Fenton would like a call once he gets the PA approved.   Has about 1 week of medication left and is concerned about not getting the PA      Jayde Fenton can reached at [phone]

## 2023-11-20 NOTE — PROGRESS NOTES
[facility]      Cardiology Consult    Zion Tello  1955 November 27, 2023    Primary Physician: Alex Ace MD  Reason for Visit: Atrial fibrillation    CC: \"I feel well\"     HPI:  The patient is 79 y.o. male with a past medical history significant for CAD, HTN, and atrial fibrillation presents for chronic management of atrial fibrillation. He was originally seen during hospitalization 8/2017 with hematuria and was incidentally found to have atrial fibrillation. He was diagnosed with bladder cancer and has completed treatment. He is tolerating Eliquis and denies any hematuria. He previously followed with EP. Today he presents for follow up and states that overall he is feeling well. He denies any new sounding cardiac complaints. He denies any chest pains or worsening shortness of breath. He reports medication compliance and is tolerating. He states that his PCP increased his Coreg to 6.25 mg BID due to elevated blood pressure. He states he does check it at home and typically runs around 057 systolic. He denies any abnormal bleeding or bruising. He denies exertional chest pain/pressure, dyspnea at rest, worsening FOSTER, PND, orthopnea, palpitations, lightheadedness, weight changes, changes in LE edema, and syncope.     Past Medical History:   Diagnosis Date    Cancer [facility])     bladder tumor     Coronary artery calcification seen on CAT scan     Hematuria 08/06/2017    Hypertension     PAF (paroxysmal atrial fibrillation) (HCC)     Pre-diabetes     pre-diabetic    Wears glasses      Past Surgical History:   Procedure Laterality Date    BLADDER TUMOR EXCISION  08/23/2017    [facility] by Dr. Perry Snell       Family History   Problem Relation Age of Onset    Heart Disease Mother         CHF    Diabetes Father     Heart Attack Father      Social History     Tobacco Use    Smoking status: Former     Types: Cigarettes     Quit date: 06/2017     Years

## 2023-11-27 ENCOUNTER — OFFICE VISIT (OUTPATIENT)
Dept: CARDIOLOGY CLINIC | Age: 68
End: 2023-11-27
Payer: MEDICARE

## 2023-11-27 VITALS
SYSTOLIC BLOOD PRESSURE: 148 MMHG | BODY MASS INDEX: 40.96 KG/M2 | WEIGHT: 292.6 LBS | HEIGHT: 71 IN | HEART RATE: 76 BPM | OXYGEN SATURATION: 95 % | DIASTOLIC BLOOD PRESSURE: 100 MMHG

## 2023-11-27 DIAGNOSIS — I10 ESSENTIAL HYPERTENSION: ICD-10-CM

## 2023-11-27 DIAGNOSIS — I48.21 PERMANENT ATRIAL FIBRILLATION (HCC): Primary | ICD-10-CM

## 2023-11-27 DIAGNOSIS — I25.10 CORONARY ARTERY DISEASE INVOLVING NATIVE CORONARY ARTERY OF NATIVE HEART WITHOUT ANGINA PECTORIS: ICD-10-CM

## 2023-11-27 PROCEDURE — 99214 OFFICE O/P EST MOD 30 MIN: CPT | Performed by: INTERNAL MEDICINE

## 2023-11-27 PROCEDURE — 1123F ACP DISCUSS/DSCN MKR DOCD: CPT | Performed by: INTERNAL MEDICINE

## 2023-11-27 PROCEDURE — 3077F SYST BP >= 140 MM HG: CPT | Performed by: INTERNAL MEDICINE

## 2023-11-27 PROCEDURE — G8427 DOCREV CUR MEDS BY ELIG CLIN: HCPCS | Performed by: INTERNAL MEDICINE

## 2023-11-27 PROCEDURE — 93000 ELECTROCARDIOGRAM COMPLETE: CPT | Performed by: INTERNAL MEDICINE

## 2023-11-27 PROCEDURE — 1036F TOBACCO NON-USER: CPT | Performed by: INTERNAL MEDICINE

## 2023-11-27 PROCEDURE — G8417 CALC BMI ABV UP PARAM F/U: HCPCS | Performed by: INTERNAL MEDICINE

## 2023-11-27 PROCEDURE — 3080F DIAST BP >= 90 MM HG: CPT | Performed by: INTERNAL MEDICINE

## 2023-11-27 PROCEDURE — G8484 FLU IMMUNIZE NO ADMIN: HCPCS | Performed by: INTERNAL MEDICINE

## 2023-11-27 PROCEDURE — 3017F COLORECTAL CA SCREEN DOC REV: CPT | Performed by: INTERNAL MEDICINE

## 2023-11-27 RX ORDER — CARVEDILOL 6.25 MG/1
6.25 TABLET ORAL 2 TIMES DAILY WITH MEALS
Qty: 180 TABLET | Refills: 3
Start: 2023-11-27

## 2023-11-27 RX ORDER — ATORVASTATIN CALCIUM 10 MG/1
10 TABLET, FILM COATED ORAL NIGHTLY
Qty: 90 TABLET | Refills: 3 | Status: SHIPPED | OUTPATIENT
Start: 2023-11-27

## 2024-04-29 RX ORDER — APIXABAN 5 MG/1
5 TABLET, FILM COATED ORAL 2 TIMES DAILY
Qty: 180 TABLET | Refills: 3 | Status: SHIPPED | OUTPATIENT
Start: 2024-04-29

## 2024-08-08 NOTE — TELEPHONE ENCOUNTER
Medication Refill    When was your last appointment with cardiology?  11/27/23  (If 1 yr or longer, please schedule appointment)    (If patient has been told they do not need to follow-up - medications should be filled by PCP)    When did you last have labs drawn? 8/24/17    Medication needing refilled? ELIQUIS     Dosage of the medication? 5 MG TABS tablet     How are you taking this medication (QD, BID, TID, QID, PRN)?  TAKE 1 TABLET BY MOUTH TWICE A DAY     Do you want a 30 or 90 day supply? 90    When will you run out of your medication?     Which Pharmacy are we sending this medication to? [facility] - [place], [place] - [address] [place] [address]     Pharmacy Phone: [phone]   Pharmacy Fax: [phone]

## 2024-08-08 NOTE — TELEPHONE ENCOUNTER
Last OV: 11/27/23  Next OV: X due yearly  Last refill: 4/29/24 #180 3 R/F  Most recent Labs: 5/22/24 in care everywhere  Last EKG (if needed): 11/27/23

## 2024-11-25 NOTE — PROGRESS NOTES
[facility]      Cardiology Consult    Elan Tello  1955    Primary Physician: Salazar Bowens MD  Reason for Visit: Atrial fibrillation    CC: \"I feel great\"     HPI:  The patient is 69 y.o. male with a past medical history significant for CAD, HTN, and atrial fibrillation presents for chronic management of atrial fibrillation. He was originally seen during hospitalization 2017 with hematuria and was incidentally found to have atrial fibrillation. He was diagnosed with bladder cancer and has completed treatment. He is tolerating Eliquis and denies any hematuria. He previously followed with EP. Today he presents for follow up and states that overall he is feeling well. He denies any new sounding cardiac complaints. He denies any chest pains or worsening shortness of breath. He reports medication compliance and is tolerating. He monitors his blood pressure at home and it is typically around 110-120s/80-90s with a HR in the 80s. He denies any abnormal bleeding or bruising. He denies exertional chest pain/pressure, dyspnea at rest, worsening FOSTER, PND, orthopnea, palpitations, lightheadedness, weight changes, changes in LE edema, and syncope.    Past Medical History:   Diagnosis Date    Cancer (HCC)     bladder tumor     Coronary artery calcification seen on CAT scan     Hematuria 2017    Hypertension     PAF (paroxysmal atrial fibrillation) (HCC)     Pre-diabetes     pre-diabetic    Wears glasses      Past Surgical History:   Procedure Laterality Date    [facility] Russ    TONSILLECTOMY AND ADENOIDECTOMY      VASECTOMY       Family History   Problem Relation Age of Onset    Heart Disease Mother         CHF    Diabetes Father     Heart Attack Father      Social History     Tobacco Use    Smoking status: Former     Current packs/day: 0.00     Types: Cigarettes     Quit date: 2017     Years since quittin.4    Smokeless

## 2024-11-26 ENCOUNTER — OFFICE VISIT (OUTPATIENT)
Dept: CARDIOLOGY CLINIC | Age: 69
End: 2024-11-26

## 2024-11-26 VITALS
HEIGHT: 71 IN | BODY MASS INDEX: 40.04 KG/M2 | HEART RATE: 84 BPM | DIASTOLIC BLOOD PRESSURE: 98 MMHG | OXYGEN SATURATION: 97 % | SYSTOLIC BLOOD PRESSURE: 138 MMHG | WEIGHT: 286 LBS

## 2024-11-26 DIAGNOSIS — I25.10 CORONARY ARTERY DISEASE INVOLVING NATIVE CORONARY ARTERY OF NATIVE HEART WITHOUT ANGINA PECTORIS: ICD-10-CM

## 2024-11-26 DIAGNOSIS — I10 ESSENTIAL HYPERTENSION: ICD-10-CM

## 2024-11-26 DIAGNOSIS — I48.21 PERMANENT ATRIAL FIBRILLATION (HCC): Primary | ICD-10-CM

## 2024-11-26 RX ORDER — CARVEDILOL 12.5 MG/1
12.5 TABLET ORAL 2 TIMES DAILY
COMMUNITY
Start: 2024-09-10

## 2024-11-26 RX ORDER — ATORVASTATIN CALCIUM 10 MG/1
10 TABLET, FILM COATED ORAL NIGHTLY
Qty: 90 TABLET | Refills: 3 | Status: SHIPPED | OUTPATIENT
Start: 2024-11-26

## 2025-03-06 RX ORDER — ATORVASTATIN CALCIUM 10 MG/1
10 TABLET, FILM COATED ORAL NIGHTLY
Qty: 90 TABLET | Refills: 3 | OUTPATIENT
Start: 2025-03-06

## 2025-08-25 RX ORDER — APIXABAN 5 MG/1
5 TABLET, FILM COATED ORAL 2 TIMES DAILY
Qty: 180 TABLET | Refills: 3 | Status: SHIPPED | OUTPATIENT
Start: 2025-08-25